# Patient Record
Sex: MALE
[De-identification: names, ages, dates, MRNs, and addresses within clinical notes are randomized per-mention and may not be internally consistent; named-entity substitution may affect disease eponyms.]

---

## 2023-02-21 PROBLEM — Z00.00 ENCOUNTER FOR PREVENTIVE HEALTH EXAMINATION: Status: ACTIVE | Noted: 2023-02-21

## 2023-03-03 ENCOUNTER — APPOINTMENT (OUTPATIENT)
Dept: RADIATION ONCOLOGY | Facility: CLINIC | Age: 78
End: 2023-03-03
Payer: MEDICARE

## 2023-03-03 DIAGNOSIS — Z80.0 FAMILY HISTORY OF MALIGNANT NEOPLASM OF DIGESTIVE ORGANS: ICD-10-CM

## 2023-03-03 DIAGNOSIS — Z87.19 PERSONAL HISTORY OF OTHER DISEASES OF THE DIGESTIVE SYSTEM: ICD-10-CM

## 2023-03-03 DIAGNOSIS — Z85.46 PERSONAL HISTORY OF MALIGNANT NEOPLASM OF PROSTATE: ICD-10-CM

## 2023-03-03 DIAGNOSIS — Z92.21 PERSONAL HISTORY OF ANTINEOPLASTIC CHEMOTHERAPY: ICD-10-CM

## 2023-03-03 DIAGNOSIS — I10 ESSENTIAL (PRIMARY) HYPERTENSION: ICD-10-CM

## 2023-03-03 DIAGNOSIS — Z87.891 PERSONAL HISTORY OF NICOTINE DEPENDENCE: ICD-10-CM

## 2023-03-03 DIAGNOSIS — Z78.9 OTHER SPECIFIED HEALTH STATUS: ICD-10-CM

## 2023-03-03 DIAGNOSIS — C15.9 MALIGNANT NEOPLASM OF ESOPHAGUS, UNSPECIFIED: ICD-10-CM

## 2023-03-03 PROCEDURE — 99205 OFFICE O/P NEW HI 60 MIN: CPT | Mod: 95

## 2023-03-03 RX ORDER — OMEPRAZOLE 20 MG/1
20 CAPSULE, DELAYED RELEASE ORAL DAILY
Refills: 0 | Status: ACTIVE | COMMUNITY
Start: 2023-03-03

## 2023-03-03 RX ORDER — AMLODIPINE BESYLATE 10 MG/1
10 TABLET ORAL DAILY
Refills: 0 | Status: ACTIVE | COMMUNITY
Start: 2023-03-03

## 2023-03-03 RX ORDER — ROSUVASTATIN CALCIUM 20 MG/1
20 TABLET, FILM COATED ORAL DAILY
Refills: 0 | Status: ACTIVE | COMMUNITY
Start: 2023-03-03

## 2023-03-05 NOTE — DISEASE MANAGEMENT
[Clinical] : TNM Stage: c [N/A] : Currently not applicable [FreeTextEntry4] : recurrent post ChemoRT [TTNM] : 2 [NTNM] : x [MTNM] : x

## 2023-03-05 NOTE — PHYSICAL EXAM
[Oriented To Time, Place, And Person] : oriented to person, place, and time [General Appearance - Well Developed] : well developed [General Appearance - Well Nourished] : well nourished [General Appearance - In No Acute Distress] : in no acute distress [Extraocular Movements] : extraocular movements were intact [Sclera] : the sclera and conjunctiva were normal [Outer Ear] : the ears and nose were normal in appearance [Hearing Threshold Finger Rub Not Wilkin] : hearing was normal [] : no respiratory distress [Respiration, Rhythm And Depth] : normal respiratory rhythm and effort [Exaggerated Use Of Accessory Muscles For Inspiration] : no accessory muscle use [Affect] : the affect was normal [Not Anxious] : not anxious [FreeTextEntry1] : limited due to video visit

## 2023-03-05 NOTE — HISTORY OF PRESENT ILLNESS
[Home] : at home, [unfilled] , at the time of the visit. [Medical Office: (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 Quijano. PMH is significant for prostate cancer s/p prostate seed implant/EBRT 2010 with stable biochemical control.\par \par Brief Oncologic history\par 12/24/2019: Esophageal biopsy showed moderately differentiated adenocarcinoma. EUS revealed superficial thickening without evidence of definitive deep invasion\par 1/7/20: Endoscopic tumor resection. Pathology showed 1.5 cm adenocarcinoma with LVSI, invasion into muscularies. Margins clear\par 2/27/20: PET/CT: intense FDG activity in distal esophagus/GE junction\par 6/4/2020: Endoscopic resection. Pathology showed moderately differentiated adenocarcinoma with invasion into muscularis mucosae. Margins positive\par 10/1/2020: PET/CT: Abnormal tracer activity in lower thoracic esophagus near GE junction suspicious for viable tumor\par 10/21/20: EGD showed ulcer with no bleeding, 6mm in length, at 40 cm, and a 20m mucosectomy scar in the cardia, biopsied \par 7/2020: RT to the esophagus 7/2020 (5040 cGy in 28 fractions, 7/14/20 - 8/20/20) and systemic therapy 2020 (carbo/taxol)\par 2/1/21: EGD showed 15mm mucosectomy scar, biopsied.\par 11/10/21: EGD showed scar in cardia that was biopsied, 15mm in length.\par - don't have records, but recurrent disease noted in repeat biopsy\par 1/24/23: EGD, 8cm hiatal hernia, nodularity at GEJ on the gastric side\par 2/16/23: EGD with EUS: 5 x 19 mm mass at GE junction with sonographic evidence suggesting submucosal invasion. No lymphadenopathy seen. Pathology per patient showed moderately differentiated adenocarcinoma (report not available)\par - per report, tumor was found at 36cm, involved 10% of the lumen, was 5mm thick and 19 mm in width, and was visible on retroflexion in the hiatal hernia/GEJ. On U/S, felt to be invading into submucosa. Not clear how far the tumor extended away from the narrower lumen of the esophagus along the curvature of the stomach.\par \par Social Hx\par Occupation: Retired \par \par Presents via telehealth 3/3//2023 for evaluation\par Feels generally well. Notes current allergy s/s  to dust/pollen. Denies chest pain, abdominal pain. Appetite is good. No swallowing issues/dysphagia, no food restrictions. Seeing [facility] for evaluation soon (Dr. Fraser, [facility]) with repeat PET/endoscopy planned.

## 2023-03-05 NOTE — REVIEW OF SYSTEMS
[Nocturia] : nocturia [Negative] : Integumentary [Visual Disturbances] : no visual disturbances [Dysphagia] : no dysphagia [Hoarseness] : no hoarseness [Chest Pain] : no chest pain [Lower Ext Edema] : no lower extremity edema [Palpitations] : no palpitations [Shortness Of Breath] : no shortness of breath [Cough] : no cough [Urinary Frequency] : no urinary frequency [Joint Pain] : no joint pain [Disturbance Of Gait] : no gait disturbance [Confused] : no confusion [Difficulty Walking] : no difficulty walking [Insomnia] : no insomnia [Anxiety] : no anxiety [Easy Bleeding] : no tendency for easy bleeding [Easy Bruising] : no tendency for easy bruising